# Patient Record
(demographics unavailable — no encounter records)

---

## 2018-06-21 NOTE — MRI
BRAIN MRI WITH AND WITHOUT CONTRAST:

 

DATE: 6/21/18.

 

COMPARISON: 

None.

 

HISTORY: 

Left breast cancer, staging examination, evaluate for intracranial metastatic disease.

 

TECHNIQUE: 

Multiplanar, multisequence MR imaging of the brain is obtained with and without contrast.

 

FINDINGS: 

The diffusion weighted imaging demonstrates no evidence for acute infarction.  

 

There is prominent degenerative change involving the temporomandibular joints with bilateral TMJ effu
sions.  There is fluid partially opacifying the mastoid air cells, right more so than left.  The para
nasal sinuses appear grossly unremarkable.

 

Arterial flow voids at axial level of skull base demonstrate a relatively hypoplastic basilar artery.
  

 

The gradient echo imaging demonstrates no evidence for intracranial hemorrhage.

 

Multiple scattered foci of increased T2 and FLAIR signal are noted within the periventricular deep an
d subcortical white matter, evidence of small vessel disease.  No ventriculomegaly, midline shift, or
 mass effect.

 

Regional bone marrow signal intensity within normal limits.  Postcontrast imaging demonstrates no nika
dence for abnormal enhancement within the brain parenchyma.

 

IMPRESSION: 

No MR evidence of intracranial metastatic disease.

 

POS: [facility]

## 2018-06-22 NOTE — PET
PET CT FROM SKULL BASE TO MID THIGH:

 

INDICATION: 

History of breast cancer status post right mastectomy in 1996 and left mastectomy in 1997.

 

TECHNIQUE: 

Multiple PET CT images were obtained after the IV administration of 11.2 mCi of F18-FDG IV.  The CT i
mages were obtained for attenuation correction purposes only.  

 

COMPARISON: 

No comparisons are available.

 

FINDINGS: 

The biodistribution for the examination appears acceptable.

 

HEAD AND NECK:

No hypermetabolic lymphadenopathy or mass is identified.  Dental amalgam limits evaluation of the ora
l cavity.

 

THORAX:

There is postsurgical change of bilateral mastectomies.  There is an area of focal subcutaneous scarr
ing with mild increased FDG uptake involving the inferior lateral aspect of the left anterior chest w
all, near the left mastectomy site.  The peak SUV value associated with this area of scarring is 1.19
.  There are surgical clips within the axillary region consistent with lymph node dissection.  There 
is postsurgical change of a partial pneumonectomy involving the right upper lobe.  A small amount of 
non-hypermetabolic fluid is seen within the right upper hemithorax.  There is scarring within the rig
ht upper hemithorax likely related to prior radiation therapy.  No hypermetabolic pulmonary nodule or
 pleural effusion is evident.  No hypermetabolic mediastinal lymph node is evident.

 

ABDOMEN AND PELVIS:

No hypermetabolic mass or lymphadenopathy is evident.  No hypermetabolic ascites is demonstrated.  

 

SKIN AND OSSEOUS STRUCTURES:

No hypermetabolic osseous abnormality is demonstrated.  No additional abnormal skin finding is presen
t.

 

IMPRESSION: 

Probably normal PET scan.

 

1.      There is a focus of mild hypermetabolic activity involving the subcutaneous tissues of the in
ferior lateral left anterior chest wall near expected surgical scar for the patient's left mastectomy
.  This is focal and only has mild increased metabolic uptake up to 1.19 and may reflect a focal skin
 lesion, possibly an infectious skin lesion.  Recommend correlation with clinical exam.  

2.      There is no overt change to suggest hypermetabolic metastatic disease.

3.      Postsurgical post therapy changes to the right chest wall.

 

POS: [facility]